# Patient Record
Sex: FEMALE | URBAN - METROPOLITAN AREA
[De-identification: names, ages, dates, MRNs, and addresses within clinical notes are randomized per-mention and may not be internally consistent; named-entity substitution may affect disease eponyms.]

---

## 2017-01-13 ENCOUNTER — APPOINTMENT (RX ONLY)
Dept: URBAN - METROPOLITAN AREA CLINIC 126 | Facility: CLINIC | Age: 67
Setting detail: DERMATOLOGY
End: 2017-01-13

## 2017-01-13 DIAGNOSIS — L57.0 ACTINIC KERATOSIS: ICD-10-CM

## 2017-01-13 PROCEDURE — ? DIAGNOSIS COMMENT

## 2017-01-13 PROCEDURE — 17000 DESTRUCT PREMALG LESION: CPT

## 2017-01-13 PROCEDURE — ? LIQUID NITROGEN

## 2017-01-13 ASSESSMENT — LOCATION DETAILED DESCRIPTION DERM: LOCATION DETAILED: LEFT INFERIOR NASAL CHEEK

## 2017-01-13 ASSESSMENT — LOCATION ZONE DERM: LOCATION ZONE: FACE

## 2017-01-13 ASSESSMENT — LOCATION SIMPLE DESCRIPTION DERM: LOCATION SIMPLE: LEFT CHEEK

## 2017-01-13 NOTE — PROCEDURE: LIQUID NITROGEN
Detail Level: Simple
Consent: The patient's consent was obtained including but not limited to risks of crusting, scabbing, blistering, scarring, darker or lighter pigmentary change, recurrence, incomplete removal and infection.
Duration Of Freeze Thaw-Cycle (Seconds): 0
Post-Care Instructions: I reviewed with the patient in detail post-care instructions. Patient is to wear sunprotection, and avoid picking at any of the treated lesions. Pt may apply Vaseline to crusted or scabbing areas.
Number Of Freeze-Thaw Cycles: 3 freeze-thaw cycles
Render Post-Care Instructions In Note?: no

## 2017-01-20 ENCOUNTER — RX ONLY (OUTPATIENT)
Age: 67
Setting detail: RX ONLY
End: 2017-01-20

## 2017-01-20 RX ORDER — TRETINOIN 0.5 MG/G
CREAM TOPICAL
Qty: 1 | Refills: 2 | Status: ERX | COMMUNITY
Start: 2017-01-20

## 2017-08-16 ENCOUNTER — APPOINTMENT (RX ONLY)
Dept: URBAN - METROPOLITAN AREA CLINIC 116 | Facility: CLINIC | Age: 67
Setting detail: DERMATOLOGY
End: 2017-08-16

## 2017-08-16 DIAGNOSIS — Z41.1 ENCOUNTER FOR COSMETIC SURGERY: ICD-10-CM

## 2017-08-16 PROCEDURE — ? RECOMMENDATIONS

## 2017-08-16 PROCEDURE — ? CONSULTATION - BREAST (COSMETIC)

## 2017-08-16 NOTE — HPI: BREAST (BREAST REDUCTION CONSULTATION - COSMETIC)
Is This A New Presentation, Or A Follow-Up?: Follow Up Macromastia
Since Your Previous Visit, Your Macromastia Is:: unchanged
Which Breast(S) Are You Concerned About?: bilateral breasts
Which Side(S)?: both breasts
How Severe Is The Macromastia?: severe
Date Of Last Mammogram?: Oct 2016
Findings: WNL

## 2018-01-12 ENCOUNTER — RX ONLY (OUTPATIENT)
Age: 68
Setting detail: RX ONLY
End: 2018-01-12

## 2018-01-12 RX ORDER — TRETINOIN 0.5 MG/G
CREAM TOPICAL
Qty: 1 | Refills: 4 | Status: ERX

## 2018-03-26 ENCOUNTER — APPOINTMENT (RX ONLY)
Dept: URBAN - METROPOLITAN AREA CLINIC 126 | Facility: CLINIC | Age: 68
Setting detail: DERMATOLOGY
End: 2018-03-26

## 2018-03-26 DIAGNOSIS — L81.4 OTHER MELANIN HYPERPIGMENTATION: ICD-10-CM

## 2018-03-26 DIAGNOSIS — L60.8 OTHER NAIL DISORDERS: ICD-10-CM

## 2018-03-26 DIAGNOSIS — L82.0 INFLAMED SEBORRHEIC KERATOSIS: ICD-10-CM

## 2018-03-26 DIAGNOSIS — L82.1 OTHER SEBORRHEIC KERATOSIS: ICD-10-CM

## 2018-03-26 DIAGNOSIS — L90.5 SCAR CONDITIONS AND FIBROSIS OF SKIN: ICD-10-CM

## 2018-03-26 PROCEDURE — ? COUNSELING

## 2018-03-26 PROCEDURE — 99214 OFFICE O/P EST MOD 30 MIN: CPT | Mod: 25

## 2018-03-26 PROCEDURE — ? TREATMENT REGIMEN

## 2018-03-26 PROCEDURE — ? LIQUID NITROGEN

## 2018-03-26 PROCEDURE — 17110 DESTRUCTION B9 LES UP TO 14: CPT

## 2018-03-26 PROCEDURE — ? DIAGNOSIS COMMENT

## 2018-03-26 ASSESSMENT — LOCATION DETAILED DESCRIPTION DERM
LOCATION DETAILED: LEFT INFERIOR CENTRAL MALAR CHEEK
LOCATION DETAILED: RIGHT SUPERIOR LATERAL MIDBACK
LOCATION DETAILED: LEFT SUPERIOR LATERAL MIDBACK
LOCATION DETAILED: LEFT GREAT TOENAIL
LOCATION DETAILED: LOWER STERNUM
LOCATION DETAILED: LEFT MEDIAL UPPER BACK

## 2018-03-26 ASSESSMENT — LOCATION ZONE DERM
LOCATION ZONE: FACE
LOCATION ZONE: TOENAIL
LOCATION ZONE: TRUNK

## 2018-03-26 ASSESSMENT — LOCATION SIMPLE DESCRIPTION DERM
LOCATION SIMPLE: LEFT CHEEK
LOCATION SIMPLE: LEFT LOWER BACK
LOCATION SIMPLE: RIGHT LOWER BACK
LOCATION SIMPLE: CHEST
LOCATION SIMPLE: LEFT GREAT TOE
LOCATION SIMPLE: LEFT UPPER BACK

## 2018-03-26 NOTE — PROCEDURE: LIQUID NITROGEN
Medical Necessity Information: It is in your best interest to select a reason for this procedure from the list below. All of these items fulfill various CMS LCD requirements except the new and changing color options.
Post-Care Instructions: I reviewed with the patient in detail post-care instructions. Patient is to wear sunprotection, and avoid picking at any of the treated lesions. Pt may apply Vaseline to crusted or scabbing areas.
Include Z78.9 (Other Specified Conditions Influencing Health Status) As An Associated Diagnosis?: No
Detail Level: Simple
Number Of Freeze-Thaw Cycles: 3 freeze-thaw cycles
Consent: The patient's consent was obtained including but not limited to risks of crusting, scabbing, blistering, scarring, darker or lighter pigmentary change, recurrence, incomplete removal and infection.
Medical Necessity Clause: This procedure was medically necessary because the lesions that were treated were:

## 2019-07-30 ENCOUNTER — APPOINTMENT (RX ONLY)
Dept: URBAN - METROPOLITAN AREA CLINIC 126 | Facility: CLINIC | Age: 69
Setting detail: DERMATOLOGY
End: 2019-07-30

## 2019-07-30 DIAGNOSIS — L65.9 NONSCARRING HAIR LOSS, UNSPECIFIED: ICD-10-CM

## 2019-07-30 DIAGNOSIS — L30.9 DERMATITIS, UNSPECIFIED: ICD-10-CM

## 2019-07-30 PROBLEM — I10 ESSENTIAL (PRIMARY) HYPERTENSION: Status: ACTIVE | Noted: 2019-07-30

## 2019-07-30 PROCEDURE — 99213 OFFICE O/P EST LOW 20 MIN: CPT

## 2019-07-30 PROCEDURE — ? PRESCRIPTION SAMPLES PROVIDED

## 2019-07-30 PROCEDURE — ? DIAGNOSIS COMMENT

## 2019-07-30 PROCEDURE — ? COUNSELING

## 2019-07-30 PROCEDURE — ? TREATMENT REGIMEN

## 2019-07-30 ASSESSMENT — LOCATION SIMPLE DESCRIPTION DERM
LOCATION SIMPLE: RIGHT SCALP
LOCATION SIMPLE: LEFT CHEEK
LOCATION SIMPLE: RIGHT CHEEK

## 2019-07-30 ASSESSMENT — LOCATION DETAILED DESCRIPTION DERM
LOCATION DETAILED: LEFT CENTRAL MALAR CHEEK
LOCATION DETAILED: RIGHT SUPERIOR CENTRAL MALAR CHEEK
LOCATION DETAILED: RIGHT MEDIAL FRONTAL SCALP

## 2019-07-30 ASSESSMENT — LOCATION ZONE DERM
LOCATION ZONE: SCALP
LOCATION ZONE: FACE

## 2019-07-30 NOTE — PROCEDURE: PRESCRIPTION SAMPLES PROVIDED
Detail Level: Zone
Samples Given: Cloderm bid x 4-5 days then stop 4-5 days
Samples Given: Cerave spf

## 2019-08-21 ENCOUNTER — RX ONLY (OUTPATIENT)
Age: 69
Setting detail: RX ONLY
End: 2019-08-21

## 2019-08-21 RX ORDER — TRETINOIN 0.5 MG/G
CREAM TOPICAL
Qty: 1 | Refills: 6 | Status: ERX

## 2024-08-21 ENCOUNTER — APPOINTMENT (RX ONLY)
Dept: URBAN - METROPOLITAN AREA CLINIC 126 | Facility: CLINIC | Age: 74
Setting detail: DERMATOLOGY
End: 2024-08-21

## 2024-08-21 DIAGNOSIS — Z41.9 ENCOUNTER FOR PROCEDURE FOR PURPOSES OTHER THAN REMEDYING HEALTH STATE, UNSPECIFIED: ICD-10-CM

## 2024-08-21 PROCEDURE — ? ADDITIONAL NOTES

## 2024-08-21 PROCEDURE — ? COSMETIC CONSULTATION: CHEMICAL PEELS

## 2024-08-21 NOTE — PROCEDURE: ADDITIONAL NOTES
Render Risk Assessment In Note?: no
Detail Level: Simple
Additional Notes: PRX Derm Perfexion (formerly known as PRX-T33), a groundbreaking and results-driven bio revitalization treatment. PRX delivers truly remarkable, age-defying benefits to the look and feel of skin without the peeling or downtime associated with chemical peels.\\n\\nPRX Derm Perfexion is a non-invasive and non-surgical treatment that combines 33% trichloroacetic acid (TCA), hydrogen peroxide and kojic acid. This dynamic trio stimulates collagen production, transforming your skin’s texture and tone. Experience the difference of PRX Derm Perfexion and elevate your skincare journey today.\\n\\n\\n\\nCan this treatment be performed on all skin types?\\n\\nThis treatment is safe for all skin types. It is recommended to consult with your provider to properly perform a skin analysis to identify possible contraindications. The most common contraindications are:\\n\\n-Skin irritation\\n-Skin sensitivity\\n-Seborrheic dermatitis\\n-Allergy to kojic acid\\n\\n\\n\\nHow is this different than a peel?\\n\\nAlthough (TCA) has historically been used as a peeling agent, PRX Derm Perfexion itself, IS NOT A PEEL! Peels are known for the inconvenience of prolonged downtime (i.e., you will l have to wait for the “peeling” to subside) and since outcomes can be difficult to control, there are increased risks (i.e., hypo- and hyper-pigmentation, infection.) This product delivers truly remarkable, age-defying benefits to the look and feel of skin without the peeling or downtime associated with chemical peels, injectables, or other invasive aesthetic treatments. \\n\\n\\n\\nWhat is the treatment process?\\n\\nAfter cleansing the skin, the provider will apply PRX Derm Perfexion, without needles or injections. After the provider has applied the correct number of layers for your indication, they will rinse the residue from your skin with water and apply a post-treatment cream. Your provider will instruct you on the home care regiment before you leave your appointment.\\n\\n\\n\\nHow often do I have to wait in between treatments?\\n\\nSeven days is the optimum interval, but your provider will advise you on a treatment plan best suited for your skin.\\n\\n\\n\\nCan I combine PRX Derm Perfexion with other treatments?\\n\\nYes. PRX Derm Perfexion can be combined with many other aesthetic procedures; your provider will advise you on the best combination therapies to address your aesthetic needs.\\n\\n\\n\\nIs there any downtime after the treatment?\\n\\nNo. You will be able to resume your social and professional life immediately after the treatment. Any redness or light flaking can be alleviated by using Moisturizing Face Cream for Dry Skin.\\n\\n\\n\\nCan this treatment be performed on all skin types?\\n\\nThis treatment is safe for all skin types. It is recommended to consult with your provider to properly perform a skin analysis to identify possible contraindications. The most common contraindications are:\\n\\n-Skin irritation\\n-Skin sensitivity\\n-Seborrheic dermatitis\\n-Allergy to kojic acid\\n\\n\\n\\nImmediate and long-lasting results with PRX Derm Perfexion (formerly known as PRX-T33):\\n\\n-No needles\\n-No pain\\n-No social downtime\\n-15-20 minute procedure\\n-Safe in summer\\n-Complete rejuvenation of the face and body

## 2024-08-21 NOTE — PROCEDURE: COSMETIC CONSULTATION: CHEMICAL PEELS
Detail Level: Detailed
Send Procedure Quote As Charge: No
Procedure Quote $ (Will Render In Note. Use Numbers Only, No Text Please.): 1200